# Patient Record
Sex: FEMALE | Race: WHITE | Employment: FULL TIME | URBAN - METROPOLITAN AREA
[De-identification: names, ages, dates, MRNs, and addresses within clinical notes are randomized per-mention and may not be internally consistent; named-entity substitution may affect disease eponyms.]

---

## 2021-07-23 ENCOUNTER — TELEPHONE (OUTPATIENT)
Dept: INTERNAL MEDICINE CLINIC | Facility: CLINIC | Age: 56
End: 2021-07-23

## 2021-07-23 NOTE — TELEPHONE ENCOUNTER
Edwige Dawson called and needed to speak with an RN. Her father was constipated after his surgery on Monday. They gave him something and now for three days he is just going water. She is wondering what to give him now.
Encounter started under pt Lan Lipscomb's chart.
no